# Patient Record
Sex: MALE | Race: WHITE | NOT HISPANIC OR LATINO | URBAN - METROPOLITAN AREA
[De-identification: names, ages, dates, MRNs, and addresses within clinical notes are randomized per-mention and may not be internally consistent; named-entity substitution may affect disease eponyms.]

---

## 2020-07-25 ENCOUNTER — TELEPHONE ENCOUNTER (OUTPATIENT)
Dept: URBAN - METROPOLITAN AREA CLINIC 13 | Facility: CLINIC | Age: 65
End: 2020-07-25

## 2020-07-26 ENCOUNTER — TELEPHONE ENCOUNTER (OUTPATIENT)
Dept: URBAN - METROPOLITAN AREA CLINIC 13 | Facility: CLINIC | Age: 65
End: 2020-07-26

## 2021-03-16 ENCOUNTER — TELEPHONE ENCOUNTER (OUTPATIENT)
Dept: URBAN - METROPOLITAN AREA CLINIC 113 | Facility: CLINIC | Age: 66
End: 2021-03-16

## 2021-03-16 VITALS — WEIGHT: 185 LBS | HEIGHT: 69 IN | BODY MASS INDEX: 27.4 KG/M2

## 2021-03-16 RX ORDER — SODIUM, POTASSIUM,MAG SULFATES 17.5-3.13G
354 ML SOLUTION, RECONSTITUTED, ORAL ORAL
Qty: 354 MILLILITER | Refills: 0 | OUTPATIENT
Start: 2021-03-16 | End: 2021-03-17

## 2021-03-16 RX ORDER — SIMVASTATIN 40 MG/1
1 TABLET IN THE EVENING TABLET, FILM COATED ORAL ONCE A DAY
Status: ACTIVE | COMMUNITY

## 2021-03-24 ENCOUNTER — LAB OUTSIDE AN ENCOUNTER (OUTPATIENT)
Dept: URBAN - METROPOLITAN AREA CLINIC 113 | Facility: CLINIC | Age: 66
End: 2021-03-24

## 2021-04-02 ENCOUNTER — CLAIMS CREATED FROM THE CLAIM WINDOW (OUTPATIENT)
Dept: URBAN - METROPOLITAN AREA CLINIC 4 | Facility: CLINIC | Age: 66
End: 2021-04-02
Payer: COMMERCIAL

## 2021-04-02 ENCOUNTER — OFFICE VISIT (OUTPATIENT)
Dept: URBAN - METROPOLITAN AREA SURGERY CENTER 25 | Facility: SURGERY CENTER | Age: 66
End: 2021-04-02
Payer: COMMERCIAL

## 2021-04-02 DIAGNOSIS — Z12.11 COLON CANCER SCREENING: ICD-10-CM

## 2021-04-02 DIAGNOSIS — D12.5 ADENOMA OF SIGMOID COLON: ICD-10-CM

## 2021-04-02 DIAGNOSIS — D12.5 BENIGN NEOPLASM OF SIGMOID COLON: ICD-10-CM

## 2021-04-02 PROCEDURE — 45380 COLONOSCOPY AND BIOPSY: CPT | Performed by: INTERNAL MEDICINE

## 2021-04-02 PROCEDURE — 88305 TISSUE EXAM BY PATHOLOGIST: CPT | Performed by: PATHOLOGY

## 2021-04-02 PROCEDURE — G8907 PT DOC NO EVENTS ON DISCHARG: HCPCS | Performed by: INTERNAL MEDICINE

## 2021-04-02 RX ORDER — SIMVASTATIN 40 MG/1
1 TABLET IN THE EVENING TABLET, FILM COATED ORAL ONCE A DAY
Status: ACTIVE | COMMUNITY

## 2021-04-21 ENCOUNTER — OFFICE VISIT (OUTPATIENT)
Dept: URBAN - METROPOLITAN AREA CLINIC 113 | Facility: CLINIC | Age: 66
End: 2021-04-21
Payer: COMMERCIAL

## 2021-04-21 ENCOUNTER — WEB ENCOUNTER (OUTPATIENT)
Dept: URBAN - METROPOLITAN AREA CLINIC 113 | Facility: CLINIC | Age: 66
End: 2021-04-21

## 2021-04-21 ENCOUNTER — DASHBOARD ENCOUNTERS (OUTPATIENT)
Age: 66
End: 2021-04-21

## 2021-04-21 VITALS
BODY MASS INDEX: 27.85 KG/M2 | DIASTOLIC BLOOD PRESSURE: 75 MMHG | HEIGHT: 69 IN | WEIGHT: 188 LBS | SYSTOLIC BLOOD PRESSURE: 130 MMHG | TEMPERATURE: 97.7 F | HEART RATE: 51 BPM

## 2021-04-21 DIAGNOSIS — D12.6 TUBULAR ADENOMA OF COLON: ICD-10-CM

## 2021-04-21 PROCEDURE — 99213 OFFICE O/P EST LOW 20 MIN: CPT | Performed by: NURSE PRACTITIONER

## 2021-04-21 RX ORDER — SIMVASTATIN 40 MG/1
1 TABLET IN THE EVENING TABLET, FILM COATED ORAL ONCE A DAY
Status: ACTIVE | COMMUNITY

## 2021-04-21 NOTE — HPI-TODAY'S VISIT:
65-year-old man presenting for follow-up after screening colonoscopy. A colonoscopy was performed on 4/2/2021.  Findings included excellent bowel preparation with Sidon bowel preparation scale score 9.  Normal examined ileum.  Descending colon diverticulosis.  Nonbleeding internal and external hemorrhoids.  Removal of a 2 mm tubular adenoma from the sigmoid colon.  He is recommended repeat colonoscopy in 5 years as part of colon cancer prevention.   Patient is without any abdominal complaints. No dysphagia, heartburn, regurgitation, unintentional weight loss, nausea, vomiting, hematemesis or melena. Bowels are moving regularly without blood per rectum. No complaints of bloating. No jaundice, icterus.